# Patient Record
Sex: FEMALE | Race: WHITE | HISPANIC OR LATINO | Employment: FULL TIME | URBAN - METROPOLITAN AREA
[De-identification: names, ages, dates, MRNs, and addresses within clinical notes are randomized per-mention and may not be internally consistent; named-entity substitution may affect disease eponyms.]

---

## 2024-07-20 ENCOUNTER — APPOINTMENT (OUTPATIENT)
Dept: GENERAL RADIOLOGY | Facility: HOSPITAL | Age: 29
End: 2024-07-20
Payer: OTHER GOVERNMENT

## 2024-07-20 ENCOUNTER — HOSPITAL ENCOUNTER (EMERGENCY)
Facility: HOSPITAL | Age: 29
Discharge: HOME | End: 2024-07-20
Attending: EMERGENCY MEDICINE
Payer: OTHER GOVERNMENT

## 2024-07-20 VITALS
WEIGHT: 158 LBS | OXYGEN SATURATION: 100 % | SYSTOLIC BLOOD PRESSURE: 131 MMHG | DIASTOLIC BLOOD PRESSURE: 70 MMHG | HEIGHT: 62 IN | TEMPERATURE: 98.1 F | HEART RATE: 69 BPM | RESPIRATION RATE: 17 BRPM | BODY MASS INDEX: 29.08 KG/M2

## 2024-07-20 DIAGNOSIS — S69.92XA INJURY OF LEFT WRIST, INITIAL ENCOUNTER: ICD-10-CM
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
## 2024-07-20 DIAGNOSIS — S99.911A INJURY OF RIGHT ANKLE, INITIAL ENCOUNTER: ICD-10-CM

## 2024-07-20 DIAGNOSIS — S62.102A CLOSED FRACTURE OF LEFT WRIST, INITIAL ENCOUNTER: Primary | ICD-10-CM

## 2024-07-20 DIAGNOSIS — S93.401A SPRAIN OF RIGHT ANKLE, UNSPECIFIED LIGAMENT, INITIAL ENCOUNTER: ICD-10-CM

## 2024-07-20 PROCEDURE — 99283 EMERGENCY DEPT VISIT LOW MDM: CPT

## 2024-07-20 PROCEDURE — 73610 X-RAY EXAM OF ANKLE: CPT

## 2024-07-20 PROCEDURE — 73110 X-RAY EXAM OF WRIST: CPT

## 2024-07-20 RX ORDER — CELECOXIB 100 MG/1
100 CAPSULE ORAL 2 TIMES DAILY
Qty: 20 CAPSULE | Refills: 0 | Status: SHIPPED | OUTPATIENT
Start: 2024-07-20 | End: 2024-07-30

## 2024-07-20 NOTE — DISCHARGE INSTRUCTIONS
Follow-up with your primary care provider when you return to [place] to ensure healing.  Return to ER symptoms worsen or fail to improve.  You are being referred to orthopedic surgery.  You will need to follow-up with orthopedic surgery if your you are still having pain after 2 weeks.    Keep wrist in brace for the next 2 weeks.  You may remove brace when sleeping and showering.  Keep ankle wrapped with Ace bandage, again you may remove when sleeping and showering.    You are being sent home with a anti-inflammatory, Celebrex.  Take twice daily for the next 10 days.  Do not take ibuprofen while also taking Celebrex.  You may take Tylenol but do not exceed 4 g in 24 hours.  When Celebrex runs out you may begin taking ibuprofen but do not exceed 2400 mg in 24 hours.    Use ice/cold packs on affected areas but do not exceed 20 minutes of contact time.  Keep affected extremities elevated when possible.

## 2024-07-20 NOTE — ED PROVIDER NOTES
"Time: 6:10 PM EDT  Date of encounter:  7/20/2024  Independent Historian/Clinical History and Information was obtained by:   Patient    History is limited by: N/A    Chief Complaint: Left wrist and right ankle pain      History of Present Illness:  Patient is a 29 y.o. year old female who presents to the emergency department for evaluation of left wrist and right ankle pain.  Patient states that she tripped over a box and had a fall about 2 weeks ago.  Patient states that the pain has been increasing since then.  Patient states she has been trying Tylenol and ibuprofen without any relief.  Patient is no prior injury.  Patient states that the pain is a 6 out of 10    HPI    Patient Care Team  Primary Care Provider: Provider, No Known    Past Medical History:     No Known Allergies  History reviewed. No pertinent past medical history.  History reviewed. No pertinent surgical history.  History reviewed. No pertinent family history.    Home Medications:  Prior to Admission medications    Not on File        Social History:   Social History     Tobacco Use    Smoking status: Never    Smokeless tobacco: Never   Vaping Use    Vaping status: Never Used   Substance Use Topics    Alcohol use: Not Currently    Drug use: Never         Review of Systems:  Review of Systems   Constitutional:  Negative for fatigue.   Respiratory:  Negative for shortness of breath.    Cardiovascular:  Negative for chest pain.   Genitourinary:  Negative for dysuria.   Musculoskeletal:  Positive for arthralgias, joint swelling and myalgias. Negative for gait problem.   Neurological:  Negative for dizziness.        Physical Exam:  /70 (BP Location: Right arm, Patient Position: Sitting)   Pulse 69   Temp 98.1 °F (36.7 °C) (Oral)   Resp 17   Ht 157.5 cm (62\")   Wt 71.7 kg (158 lb)   SpO2 100%   BMI 28.90 kg/m²     Physical Exam  Vitals reviewed.   Constitutional:       Appearance: Normal appearance.   HENT:      Head: Normocephalic. "   Musculoskeletal:      Right wrist: Normal.      Left wrist: Swelling and tenderness present. No effusion. Decreased range of motion. Normal pulse.      Right ankle: Swelling present. Tenderness present.      Left ankle: Normal.   Skin:     General: Skin is warm.      Findings: No rash.   Neurological:      General: No focal deficit present.      Mental Status: She is alert.                  Procedures:  Procedures      Medical Decision Making:      Comorbidities that affect care:    None    External Notes reviewed:    None      The following orders were placed and all results were independently analyzed by me:  Orders Placed This Encounter   Procedures    [facility] 06.  Wrist Brace    XR Wrist 3+ View Left    XR Ankle 3+ View Right    Ambulatory Referral to Orthopedic Surgery    Apply ace wrap       Medications Given in the Emergency Department:  Medications - No data to display     ED Course:    ED Course as of 07/20/24 2304   Sat Jul 20, 2024 2303 XR Ankle 3+ View Right  X-ray interpreted by Bailey Seaver, APRN, FNP-C [AS]   2304 XR Wrist 3+ View Left  X-ray interpreted by Bailey Seaver, APRN, FNP-C   [AS]      ED Course User Index  [AS] Seaver, Alyce B, APRN       Labs:    Lab Results (last 24 hours)       ** No results found for the last 24 hours. **             Imaging:    XR Ankle 3+ View Right    Result Date: 7/20/2024  XR ANKLE 3+ VW RIGHT Date of Exam: 7/20/2024 5:28 PM EDT Indication: fall, injury Comparison: None available. Findings: Lateral soft tissue swelling is present. The ankle mortise is intact. No fractures, dislocations or acute osseous abnormalities are identified.     Impression: Lateral soft tissue swelling. No acute osseous abnormalities are identified in the right ankle. Electronically Signed: Mario Gomez MD  7/20/2024 5:44 PM EDT  Workstation ID: [id number]    XR Wrist 3+ View Left    Result Date: 7/20/2024  XR WRIST 3+ VW LEFT Date of Exam: 7/20/2024 5:27 PM EDT Indication:  fall, injury Comparison: None available. Findings: There is a 2 mm ossification adjacent to the ulnar styloid process. No other fractures or acute osseous abnormalities are identified.     Impression: 2 mm ossification adjacent to the ulnar styloid process may represent age-indeterminate fracture. Electronically Signed: Mario Gomez MD  7/20/2024 5:42 PM EDT  Workstation ID: [id number]       Differential Diagnosis and Discussion:    Orthopedic Injuries: Differential diagnosis includes but is not limited to fractures, soft tissue injuries, dislocations, contusions, ligamentous injuries, tendon injuries, nerve injuries, compartment syndrome, bursitis, and vascular injuries.    All X-rays impressions were independently interpreted by me.    MDM  Number of Diagnoses or Management Options  Closed fracture of left wrist, initial encounter  Fall, initial encounter  Injury of left wrist, initial encounter  Injury of right ankle, initial encounter  Sprain of right ankle, unspecified ligament, initial encounter  Diagnosis management comments: The patient's symptoms are consistent with a strain vs. sprain.      A muscle strain, also known as a pulled muscle, is an injury that occurs when a muscle is overstretched or torn, often as a result of fatigue, overuse, or improper use. This can result in pain, swelling, and a limited range of motion.     A sprain, on the other hand, is an injury to a ligament, which is the tissue that connects bones to each other. Sprains often occur in joints like the ankle or wrist when they are twisted or impacted in a way that stretches or tears the ligaments. Symptoms of a sprain can include pain, swelling, bruising, and a decreased ability to move the joint.     The patient was counseled to use rest, ice, and elevation and follow-up with their PCP or an orthopedic surgeon.  The patient was placed in a wrist splint in the emergency department. The patient was reassessed status post splinting. The  patient in neurovascular intact with no numbness, tingling, or signs of compartment syndrome. The patient was counseled to follow up with the orthopedic surgeon as detailed in the discharge instructions. The patient was counseled on the signs and symptoms of compartment syndrome including worsening pain, swelling, sensory abnormalities, and color change. The patient was instructed to return to the ED sooner for re-evaluation of any of these symptoms.       Amount and/or Complexity of Data Reviewed  Tests in the radiology section of CPT®: reviewed       Patient Care Considerations:    CT EXTREMITY: I considered ordering an extremity CT, however patient is set to follow-up with orthopedic surgery who will provide further management and workup      Consultants/Shared Management Plan:    Consultant: I have discussed the case with Dr. Denise who states patient can be placed in a sling and follow-up in office    Social Determinants of Health:    Patient is independent, reliable, and has access to care.       Disposition and Care Coordination:    Discharged: The patient is suitable and stable for discharge with no need for consideration of admission.    I have explained the patient´s condition, diagnoses and treatment plan based on the information available to me at this time. I have answered questions and addressed any concerns. The patient has a good  understanding of the patient´s diagnosis, condition, and treatment plan as can be expected at this point. The vital signs have been stable. The patient´s condition is stable and appropriate for discharge from the emergency department.      The patient will pursue further outpatient evaluation with the primary care physician or other designated or consulting physician as outlined in the discharge instructions. They are agreeable to this plan of care and follow-up instructions have been explained in detail. The patient has received these instructions in written format and has  expressed an understanding of the discharge instructions. The patient is aware that any significant change in condition or worsening of symptoms should prompt an immediate return to this or the closest emergency department or call to 911.    Final diagnoses:   Closed fracture of left wrist, initial encounter   Fall, initial encounter   Injury of left wrist, initial encounter   Injury of right ankle, initial encounter   Sprain of right ankle, unspecified ligament, initial encounter        ED Disposition       ED Disposition   Discharge    Condition   Stable    Comment   --                Discharge Medications        New Medications        Instructions Start Date   celecoxib 100 MG capsule  Commonly known as: CeleBREX   100 mg, Oral, 2 Times Daily               This medical record created using voice recognition software.             Seaver, Alyce B, APRN  07/20/24 3409

## 2024-07-22 ENCOUNTER — TELEPHONE (OUTPATIENT)
Dept: ORTHOPEDIC SURGERY | Facility: CLINIC | Age: 29
End: 2024-07-22
Payer: OTHER GOVERNMENT

## 2024-07-22 NOTE — TELEPHONE ENCOUNTER
NEW PT - L WRIST INJURY, R ANKLE INJURY/SPRAIN, Closed fracture of left wrist - PROG NOTE ED 7.20.24 - XR 7.20.24 - NO KNOWN SX